# Patient Record
Sex: MALE | Race: BLACK OR AFRICAN AMERICAN | NOT HISPANIC OR LATINO | Employment: UNEMPLOYED | URBAN - NONMETROPOLITAN AREA
[De-identification: names, ages, dates, MRNs, and addresses within clinical notes are randomized per-mention and may not be internally consistent; named-entity substitution may affect disease eponyms.]

---
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
## 2023-09-01 ENCOUNTER — OFFICE VISIT (OUTPATIENT)
Dept: FAMILY MEDICINE CLINIC | Facility: CLINIC | Age: 33
End: 2023-09-01
Payer: COMMERCIAL

## 2023-09-01 DIAGNOSIS — Z11.1 TUBERCULOSIS SCREENING: ICD-10-CM

## 2023-09-01 DIAGNOSIS — R00.2 PALPITATIONS: ICD-10-CM

## 2023-09-01 DIAGNOSIS — R76.8 HEPATITIS A ANTIBODY POSITIVE: ICD-10-CM

## 2023-09-01 DIAGNOSIS — Z02.89 HISTORY AND PHYSICAL EXAMINATION, IMMIGRATION: ICD-10-CM

## 2023-09-01 DIAGNOSIS — Z11.59 NEED FOR HEPATITIS C SCREENING TEST: ICD-10-CM

## 2023-09-01 DIAGNOSIS — R10.13 EPIGASTRIC DISCOMFORT: ICD-10-CM

## 2023-09-01 DIAGNOSIS — Z11.4 ENCOUNTER FOR SCREENING FOR HIV: ICD-10-CM

## 2023-09-01 DIAGNOSIS — B49 FUNGAL INFECTION: Primary | ICD-10-CM

## 2023-09-01 DIAGNOSIS — Z11.59 NEED FOR HEPATITIS B SCREENING TEST: ICD-10-CM

## 2023-09-01 LAB
ALBUMIN SERPL-MCNC: 4.9 G/DL (ref 3.5–5.2)
ALBUMIN/GLOB SERPL: 1.7 G/DL
ALP SERPL-CCNC: 61 U/L (ref 39–117)
ALT SERPL W P-5'-P-CCNC: 19 U/L (ref 1–41)
ANION GAP SERPL CALCULATED.3IONS-SCNC: 11 MMOL/L (ref 5–15)
AST SERPL-CCNC: 31 U/L (ref 1–40)
BILIRUB SERPL-MCNC: 0.4 MG/DL (ref 0–1.2)
BILIRUB UR QL STRIP: NEGATIVE
BUN SERPL-MCNC: 14 MG/DL (ref 6–20)
BUN/CREAT SERPL: 13.9 (ref 7–25)
CALCIUM SPEC-SCNC: 9.6 MG/DL (ref 8.6–10.5)
CHLORIDE SERPL-SCNC: 101 MMOL/L (ref 98–107)
CHOLEST SERPL-MCNC: 143 MG/DL (ref 0–200)
CLARITY UR: CLEAR
CO2 SERPL-SCNC: 24 MMOL/L (ref 22–29)
COLOR UR: YELLOW
CREAT SERPL-MCNC: 1.01 MG/DL (ref 0.76–1.27)
DEPRECATED RDW RBC AUTO: 42.4 FL (ref 37–54)
EGFRCR SERPLBLD CKD-EPI 2021: 102 ML/MIN/1.73
ERYTHROCYTE [DISTWIDTH] IN BLOOD BY AUTOMATED COUNT: 13.9 % (ref 12.3–15.4)
GLOBULIN UR ELPH-MCNC: 2.9 GM/DL
GLUCOSE SERPL-MCNC: 100 MG/DL (ref 65–99)
GLUCOSE UR STRIP-MCNC: NEGATIVE MG/DL
HBA1C MFR BLD: 5.4 % (ref 4.8–5.6)
HBV SURFACE AG SERPL QL IA: NORMAL
HCT VFR BLD AUTO: 49 % (ref 37.5–51)
HCV AB SER DONR QL: NORMAL
HDLC SERPL-MCNC: 55 MG/DL (ref 40–60)
HGB BLD-MCNC: 17.4 G/DL (ref 13–17.7)
HGB UR QL STRIP.AUTO: NEGATIVE
HIV1 P24 AG SER QL: NORMAL
HIV1+2 AB SER QL: NORMAL
KETONES UR QL STRIP: NEGATIVE
LDLC SERPL CALC-MCNC: 78 MG/DL (ref 0–100)
LDLC/HDLC SERPL: 1.44 {RATIO}
LEUKOCYTE ESTERASE UR QL STRIP.AUTO: NEGATIVE
MCH RBC QN AUTO: 30.1 PG (ref 26.6–33)
MCHC RBC AUTO-ENTMCNC: 35.5 G/DL (ref 31.5–35.7)
MCV RBC AUTO: 84.8 FL (ref 79–97)
NITRITE UR QL STRIP: NEGATIVE
PH UR STRIP.AUTO: 7.5 [PH] (ref 5–8)
PLATELET # BLD AUTO: 302 10*3/MM3 (ref 140–450)
PMV BLD AUTO: 9.2 FL (ref 6–12)
POTASSIUM SERPL-SCNC: 3.5 MMOL/L (ref 3.5–5.2)
PROT SERPL-MCNC: 7.8 G/DL (ref 6–8.5)
PROT UR QL STRIP: NEGATIVE
RBC # BLD AUTO: 5.78 10*6/MM3 (ref 4.14–5.8)
SODIUM SERPL-SCNC: 136 MMOL/L (ref 136–145)
SP GR UR STRIP: 1.02 (ref 1–1.03)
TRIGL SERPL-MCNC: 44 MG/DL (ref 0–150)
TSH SERPL DL<=0.05 MIU/L-ACNC: 3.19 UIU/ML (ref 0.27–4.2)
UROBILINOGEN UR QL STRIP: NORMAL
VIT B12 BLD-MCNC: 351 PG/ML (ref 211–946)
VLDLC SERPL-MCNC: 10 MG/DL (ref 5–40)
WBC NRBC COR # BLD: 3.13 10*3/MM3 (ref 3.4–10.8)

## 2023-09-01 PROCEDURE — 81003 URINALYSIS AUTO W/O SCOPE: CPT

## 2023-09-01 PROCEDURE — 86480 TB TEST CELL IMMUN MEASURE: CPT

## 2023-09-01 PROCEDURE — 84443 ASSAY THYROID STIM HORMONE: CPT

## 2023-09-01 PROCEDURE — 86803 HEPATITIS C AB TEST: CPT

## 2023-09-01 PROCEDURE — 80061 LIPID PANEL: CPT

## 2023-09-01 PROCEDURE — 86709 HEPATITIS A IGM ANTIBODY: CPT

## 2023-09-01 PROCEDURE — 87491 CHLMYD TRACH DNA AMP PROBE: CPT

## 2023-09-01 PROCEDURE — 86762 RUBELLA ANTIBODY: CPT

## 2023-09-01 PROCEDURE — 80053 COMPREHEN METABOLIC PANEL: CPT

## 2023-09-01 PROCEDURE — 87591 N.GONORRHOEAE DNA AMP PROB: CPT

## 2023-09-01 PROCEDURE — 86708 HEPATITIS A ANTIBODY: CPT

## 2023-09-01 PROCEDURE — 82607 VITAMIN B-12: CPT

## 2023-09-01 PROCEDURE — 85027 COMPLETE CBC AUTOMATED: CPT

## 2023-09-01 PROCEDURE — 86706 HEP B SURFACE ANTIBODY: CPT

## 2023-09-01 PROCEDURE — 87340 HEPATITIS B SURFACE AG IA: CPT

## 2023-09-01 PROCEDURE — 86787 VARICELLA-ZOSTER ANTIBODY: CPT

## 2023-09-01 PROCEDURE — 86592 SYPHILIS TEST NON-TREP QUAL: CPT

## 2023-09-01 PROCEDURE — G0475 HIV COMBINATION ASSAY: HCPCS

## 2023-09-01 PROCEDURE — 83036 HEMOGLOBIN GLYCOSYLATED A1C: CPT

## 2023-09-01 PROCEDURE — 86765 RUBEOLA ANTIBODY: CPT

## 2023-09-01 PROCEDURE — 86704 HEP B CORE ANTIBODY TOTAL: CPT

## 2023-09-01 PROCEDURE — 86735 MUMPS ANTIBODY: CPT

## 2023-09-01 RX ORDER — CLOTRIMAZOLE 1 %
1 CREAM (GRAM) TOPICAL 2 TIMES DAILY
Qty: 40 G | Refills: 0 | Status: SHIPPED | OUTPATIENT
Start: 2023-09-01

## 2023-09-02 LAB
HAV AB SER QL IA: POSITIVE
HBV CORE AB SERPL QL IA: POSITIVE
HBV SURFACE AB SER RIA-ACNC: REACTIVE
MEV IGG SER IA-ACNC: >300 AU/ML
MUV IGG SER IA-ACNC: 111 AU/ML
RPR SER QL: NORMAL
RUBV IGG SERPL IA-ACNC: 3.9 INDEX
VZV IGG SER IA-ACNC: 1006 INDEX

## 2023-09-03 LAB
GAMMA INTERFERON BACKGROUND BLD IA-ACNC: 0.06 IU/ML
M TB IFN-G BLD-IMP: NEGATIVE
M TB IFN-G CD4+ T-CELLS BLD-ACNC: 0.06 IU/ML
M TBIFN-G CD4+ CD8+T-CELLS BLD-ACNC: 0.06 IU/ML
MITOGEN IGNF BLD-ACNC: >10 IU/ML
QUANTIFERON INCUBATION: NORMAL
SERVICE CMNT-IMP: NORMAL

## 2023-09-04 VITALS
DIASTOLIC BLOOD PRESSURE: 86 MMHG | TEMPERATURE: 98.2 F | HEART RATE: 89 BPM | WEIGHT: 173.2 LBS | SYSTOLIC BLOOD PRESSURE: 136 MMHG | HEIGHT: 66 IN | BODY MASS INDEX: 27.83 KG/M2 | OXYGEN SATURATION: 98 % | RESPIRATION RATE: 15 BRPM

## 2023-09-04 DIAGNOSIS — R76.8 HEPATITIS A ANTIBODY POSITIVE: Primary | ICD-10-CM

## 2023-09-04 LAB — HAV IGM SERPL QL IA: NORMAL

## 2023-09-05 DIAGNOSIS — A74.9 CHLAMYDIA: Primary | ICD-10-CM

## 2023-09-05 DIAGNOSIS — R76.8 HEPATITIS B ANTIBODY POSITIVE: Primary | ICD-10-CM

## 2023-09-05 LAB
C TRACH RRNA SPEC QL NAA+PROBE: POSITIVE
N GONORRHOEA RRNA SPEC QL NAA+PROBE: NEGATIVE

## 2023-09-05 RX ORDER — DOXYCYCLINE HYCLATE 100 MG/1
100 CAPSULE ORAL 2 TIMES DAILY
Qty: 14 CAPSULE | Refills: 0 | Status: SHIPPED | OUTPATIENT
Start: 2023-09-05 | End: 2023-09-12

## 2023-09-15 ENCOUNTER — OFFICE VISIT (OUTPATIENT)
Dept: FAMILY MEDICINE CLINIC | Facility: CLINIC | Age: 33
End: 2023-09-15
Payer: COMMERCIAL

## 2023-09-15 VITALS
SYSTOLIC BLOOD PRESSURE: 132 MMHG | TEMPERATURE: 97.6 F | OXYGEN SATURATION: 100 % | BODY MASS INDEX: 27.48 KG/M2 | WEIGHT: 171 LBS | HEART RATE: 99 BPM | RESPIRATION RATE: 16 BRPM | HEIGHT: 66 IN | DIASTOLIC BLOOD PRESSURE: 70 MMHG

## 2023-09-15 DIAGNOSIS — R10.13 EPIGASTRIC DISCOMFORT: ICD-10-CM

## 2023-09-15 DIAGNOSIS — R00.2 PALPITATIONS: ICD-10-CM

## 2023-09-15 DIAGNOSIS — A74.9 CHLAMYDIA: ICD-10-CM

## 2023-09-15 DIAGNOSIS — B49 FUNGAL INFECTION: Primary | ICD-10-CM

## 2023-09-15 NOTE — PROGRESS NOTES
"    Office Note     Name: Meaghan Smith    : 1990     MRN: [id number]     Chief Complaint  Two week follow up     History of Present Illness:  Meaghan Smith is a 33 y.o. male who presents today for a 2-week follow-up visit.  He was seen in the office 2 weeks ago for establishment of care and for several health concerns.    At his last appointment, he was started on topical clotrimazole for a fungal infection between his right fourth and fifth toes.  He reports that he was unable to get the medication until this week so he has only been applying it for 1 week.  The area has seemed slightly smaller and he reports there is less pain at the area when he wears tight shoes but the area is still there.  He denies any side effects to the cream.  He has had no fever or chills or worsening of the area.    He also reports that he is tolerating the doxycycline well, that he was treated with for chlamydia.  He denies any  symptoms such as penile discharge, rash, dysuria, etc.  He denies having any recent sexual partners, most recent sexual partner does not live in this country.    He has not completed his H. pylori breath testing yet.  He denies any worsening of his acid reflux symptoms.    He denies that he has had further episodes of heart palpitations, reports that these \"only happen when he is stressed.\"  Reports that he began experiencing this as a small child, tells an example of having his heart race when the teacher called on him in school.  He denies feeling worried or stressed very frequently, denies discussion of medications for anxiety/stress.  He denies chest pain.      Subjective     Review of Systems:   Review of Systems   Constitutional:  Negative for chills and fever.   Respiratory:  Negative for cough and shortness of breath.    Cardiovascular:  Negative for chest pain, palpitations and leg swelling.   Gastrointestinal:  Negative for abdominal pain, constipation, diarrhea, nausea and " "vomiting.   Genitourinary:  Negative for discharge, dysuria and penile pain.   Skin:  Negative for rash.   Neurological:  Negative for dizziness, weakness, light-headedness and headache.   Psychiatric/Behavioral:  Negative for depressed mood. The patient is not nervous/anxious.      I have reviewed the patients family history, social history, past medical history, past surgical history and have updated it as appropriate.     Past Medical History: No past medical history on file.    Past Surgical History: No past surgical history on file.    Family History:   Family History   Problem Relation Age of Onset    Stroke Maternal Grandmother        Social History:   Social History     Socioeconomic History    Marital status: Single   Tobacco Use    Smoking status: Never    Smokeless tobacco: Never   Vaping Use    Vaping Use: Never used   Substance and Sexual Activity    Alcohol use: Yes     Alcohol/week: 2.0 standard drinks     Types: 2 Cans of beer per week     Comment: weekly    Drug use: Never    Sexual activity: Not Currently       Immunizations:   There is no immunization history on file for this patient.     Medications:     Current Outpatient Medications:     clotrimazole (LOTRIMIN) 1 % cream, Apply 1 application  topically to the appropriate area as directed 2 (Two) Times a Day. Apply to affected area twice per day for 2 weeks, Disp: 40 g, Rfl: 0    Allergies:   No Known Allergies    Objective     Vital Signs  Vitals:    09/15/23 1041   BP: 132/70   BP Location: Right arm   Patient Position: Sitting   Cuff Size: Large Adult   Pulse: 99   Resp: 16   Temp: 97.6 °F (36.4 °C)   TempSrc: Temporal   SpO2: 100%   Weight: 77.6 kg (171 lb)   Height: 167.6 cm (66\")     Estimated body mass index is 27.6 kg/m² as calculated from the following:    Height as of this encounter: 167.6 cm (66\").    Weight as of this encounter: 77.6 kg (171 lb).          Physical Exam  Vitals and nursing note reviewed.   Constitutional:       " General: He is not in acute distress.     Appearance: Normal appearance. He is not ill-appearing, toxic-appearing or diaphoretic.   HENT:      Head: Normocephalic and atraumatic.   Eyes:      Extraocular Movements: Extraocular movements intact.   Cardiovascular:      Rate and Rhythm: Normal rate and regular rhythm.      Pulses:           Dorsalis pedis pulses are 2+ on the right side.        Posterior tibial pulses are 2+ on the right side.      Heart sounds: No murmur heard.    No friction rub. No gallop.   Pulmonary:      Effort: Pulmonary effort is normal. No respiratory distress.      Breath sounds: No wheezing, rhonchi or rales.   Abdominal:      Palpations: Abdomen is soft.      Tenderness: There is no abdominal tenderness. There is no guarding or rebound.   Musculoskeletal:      Cervical back: Normal range of motion.   Feet:      Right foot:      Skin integrity: No ulcer, blister or erythema.   Skin:     Coloration: Skin is not pale.      Comments: White appearing skin lesion between R 4th and 5th toes is still  present, but smaller in size.  No swelling to the area, no open wound.   Neurological:      Mental Status: He is alert and oriented to person, place, and time. Mental status is at baseline.   Psychiatric:         Mood and Affect: Mood normal.         Thought Content: Thought content normal.        Assessment and Plan     1. Fungal infection  -Continue with prescription clotrimazole ointment.  -We will reexamine his skin at follow-up appointment.  Did discuss that it can take 2 weeks or more for fungal infections to fully clear with topical treatment.  If symptoms worsen or new symptoms develop, he has been encouraged to return to care sooner.    2. Palpitations  -He denies further episodes of palpitations.  -Advised that he decrease caffeine intake and increase water intake.  -Did discuss we are still awaiting referral for cardiology consultation.    3. Epigastric discomfort  -Patient denies any  worsening of his epigastric discomfort. Physical exam is benign. Did remind him to have H. pylori breath testing completed.  If H. pylori breath testing is negative, we will initiate trial of PPI and see how he does.    4. Chlamydia  -Continue with course of doxycycline.  He has tolerated medication well. Did discuss possible side effect of photosensitivity, advised that he avoid excessive sun exposure and wear sun screen while on medication.  -Discussed that he should not be sexually active until his treatment is completely finished.  -Did discuss treating his left sexual partner, however they are not living in this country.       I spent 31 minutes caring for Meaghan Smith on this date of service. This time includes time spent by me in the following activities: preparing for the visit, reviewing tests, performing a medically appropriate examination and/or evaluation, counseling and educating the patient/family/caregiver, ordering medications, tests, or procedures and documenting information in the medical record.    Follow Up  Return in about 1 month (around 10/15/2023) for Recheck.    SEFERINO Graham

## 2023-09-28 ENCOUNTER — LAB (OUTPATIENT)
Dept: LAB | Facility: HOSPITAL | Age: 33
End: 2023-09-28
Payer: COMMERCIAL

## 2023-09-28 DIAGNOSIS — R10.13 EPIGASTRIC DISCOMFORT: ICD-10-CM

## 2023-09-28 DIAGNOSIS — R76.8 HEPATITIS B ANTIBODY POSITIVE: ICD-10-CM

## 2023-09-28 DIAGNOSIS — Z02.89 HISTORY AND PHYSICAL EXAMINATION, IMMIGRATION: ICD-10-CM

## 2023-09-28 LAB — URATE SERPL-MCNC: 5.7 MG/DL (ref 3.4–7)

## 2023-09-28 PROCEDURE — 87517 HEPATITIS B DNA QUANT: CPT

## 2023-09-28 PROCEDURE — 83013 H PYLORI (C-13) BREATH: CPT

## 2023-09-28 PROCEDURE — 84550 ASSAY OF BLOOD/URIC ACID: CPT

## 2023-09-29 LAB
HBV DNA SERPL NAA+PROBE-ACNC: NORMAL IU/ML
HBV DNA SERPL NAA+PROBE-LOG IU: NORMAL LOG10 IU/ML
REF LAB TEST REF RANGE: NORMAL

## 2023-09-30 LAB — UREA BREATH TEST QL: POSITIVE

## 2023-10-02 DIAGNOSIS — A04.8 H. PYLORI INFECTION: Primary | ICD-10-CM

## 2023-10-02 RX ORDER — CLARITHROMYCIN 500 MG/1
500 TABLET, COATED ORAL 2 TIMES DAILY
Qty: 28 TABLET | Refills: 0 | Status: SHIPPED | OUTPATIENT
Start: 2023-10-02 | End: 2023-10-16

## 2023-10-02 RX ORDER — AMOXICILLIN 500 MG/1
1000 CAPSULE ORAL 2 TIMES DAILY
Qty: 56 CAPSULE | Refills: 0 | Status: SHIPPED | OUTPATIENT
Start: 2023-10-02 | End: 2023-10-16

## 2023-10-02 RX ORDER — OMEPRAZOLE 20 MG/1
20 CAPSULE, DELAYED RELEASE ORAL 2 TIMES DAILY
Qty: 28 CAPSULE | Refills: 0 | Status: SHIPPED | OUTPATIENT
Start: 2023-10-02 | End: 2023-10-16

## 2023-10-16 ENCOUNTER — PATIENT ROUNDING (BHMG ONLY) (OUTPATIENT)
Dept: FAMILY MEDICINE CLINIC | Facility: CLINIC | Age: 33
End: 2023-10-16
Payer: COMMERCIAL

## 2023-10-16 NOTE — PROGRESS NOTES
A [employer] message has been sent to the patient for PATIENT   ROUNDING with [facility]

## 2023-10-16 NOTE — PROGRESS NOTES
"[facility] Cardiology  Consultation H&P  Meaghan Smith  1990  [address] [address]     VISIT DATE:  10/20/23    PCP: Deborah Burroughs PA-C [address] [facility] [place] [address]    IDENTIFICATION: A 33 y.o. [place] male     PROBLEM LIST:  Palpitations \"when stressed\"  GERD  (+) H. pylori 9/23  Chlamydia  Lipid status 9/23 , trig 44, HDL 55, LDL 70      CC:  Chief Complaint   Patient presents with    Chest Pain           Allergies  No Known Allergies    Current Medications    Current Outpatient Medications:     clotrimazole (LOTRIMIN) 1 % cream, Apply 1 application  topically to the appropriate area as directed 2 (Two) Times a Day. Apply to affected area twice per day for 2 weeks, Disp: 40 g, Rfl: 0     History of Present Illness   HPI    Patient's main language is Mexican and only understands minimal English.   via telephone was used for history.    Meaghan Smith is a 33 y.o. year old male with the above mentioned PMH who presents for consult from Deborah Burroughs PA-C for evaluation of palpitations.  He notes that since he was a child he has had elevated heart rates and palpitations whenever trying anything new for the first time or being in a new place with related anxiety.  These episodes are not associated with any other symptoms like shortness of breath, chest discomfort, near syncope or diaphoresis.  He drinks just 1 cup of coffee in the morning but no other energy drinks and has discontinued soda on a regular basis.  He denies any decongestants or illegal substance use.  His resting heart rate is 110 in office today.    Pt denies any chest pain, dyspnea at rest, dyspnea on exertion, orthopnea, PND, lower extremity edema, or claudication. Pt denies history of CHF, DVT, PE, MI, CVA, TIA, or rheumatic fever.       ROS  Review of Systems   Cardiovascular:  Positive for palpitations.   Psychiatric/Behavioral:  The patient is nervous/anxious.  " "  All other systems reviewed and are negative.      SOCIAL HX  Social History     Socioeconomic History    Marital status: Single   Tobacco Use    Smoking status: Never    Smokeless tobacco: Never   Vaping Use    Vaping Use: Never used   Substance and Sexual Activity    Alcohol use: Yes     Alcohol/week: 2.0 standard drinks of alcohol     Types: 2 Cans of beer per week     Comment: occ    Drug use: Never    Sexual activity: Not Currently       FAMILY HX  Family History   Problem Relation Age of Onset    Hypertension Mother     Stroke Maternal Grandmother     Hypertension Maternal Grandmother        Vitals:    10/20/23 1121   BP: 136/76   BP Location: Right arm   Patient Position: Sitting   Pulse: 87   SpO2: 100%   Weight: 75.7 kg (166 lb 12.8 oz)   Height: 167.6 cm (65.98\")     Body mass index is 26.94 kg/m².     PHYSICAL EXAMINATION:  Constitutional:       Appearance: Healthy appearance. Not in distress.   Pulmonary:      Effort: Pulmonary effort is normal.      Breath sounds: Normal breath sounds. No wheezing. No rhonchi. No rales.   Cardiovascular:      PMI at left midclavicular line. Tachycardia present. Regular rhythm. Normal S1. Normal S2.       Murmurs: There is no murmur.      No gallop.  No click. No rub.   Pulses:     Intact distal pulses.   Edema:     Peripheral edema absent.   Skin:     General: Skin is warm and dry.   Neurological:      General: No focal deficit present.      Mental Status: Alert and oriented to person, place and time.         Diagnostic Data:    ECG 12 Lead    Date/Time: 10/20/2023 12:47 PM  Performed by: Ryan Hurt PA-C    Authorized by: Ryan Hurt PA-C  Comparison: not compared with previous ECG   Previous ECG: no previous ECG available  Rhythm: sinus tachycardia  Rate: tachycardic  BPM: 113  ST Flattening: II, III and aVF  T inversion: V5 and V6  QRS axis: right  Other findings: non-specific ST-T wave changes    Clinical impression: non-specific ECG        Lab Results "   Component Value Date    TRIG 44 09/01/2023    HDL 55 09/01/2023     Lab Results   Component Value Date    GLUCOSE 100 (H) 09/01/2023    BUN 14 09/01/2023    CREATININE 1.01 09/01/2023     09/01/2023    K 3.5 09/01/2023     09/01/2023    CO2 24.0 09/01/2023     Lab Results   Component Value Date    HGBA1C 5.40 09/01/2023     Lab Results   Component Value Date    WBC 3.13 (L) 09/01/2023    HGB 17.4 09/01/2023    HCT 49.0 09/01/2023     09/01/2023         Advance Care Planning   ACP discussion was declined by the patient. Patient does not have an advance directive, declines further assistance.         ASSESSMENT:   Diagnosis Plan   1. Palpitations  Holter Monitor - 72 Hour Up To 15 Days    ECG 12 Lead      2. Sinus tachycardia            PLAN:  Palpitations/Sinus Tachycardia- Patient with resting heart rate 110 in office today.  He notes palpitations and high heart rate associated with anxiety and being in new places since he was a kid.  He has not had any full evaluation of this.  I would recommend consideration of treatment of his anxiety.  Additionally, we will place a 7-day Holter monitor to rule out any tachyarrhythmia and assess tachy burden.  We discussed the addition of bisoprolol as needed or scheduled following Holter monitor results.    Deborah Burroughs PA-C, thank you for referring Mr. Smith for evaluation.  I have forwarded my electronically generated recommendations to you for review.  Please do not hesitate to call with any questions.    Electronically signed by Ryan Hurt PA-C, 10/20/23, 12:52 PM EDT.

## 2023-10-20 ENCOUNTER — OFFICE VISIT (OUTPATIENT)
Dept: CARDIOLOGY | Facility: CLINIC | Age: 33
End: 2023-10-20
Payer: COMMERCIAL

## 2023-10-20 VITALS
OXYGEN SATURATION: 100 % | DIASTOLIC BLOOD PRESSURE: 76 MMHG | SYSTOLIC BLOOD PRESSURE: 136 MMHG | WEIGHT: 166.8 LBS | BODY MASS INDEX: 26.81 KG/M2 | HEIGHT: 66 IN | HEART RATE: 87 BPM

## 2023-10-20 DIAGNOSIS — R00.0 SINUS TACHYCARDIA: ICD-10-CM

## 2023-10-20 DIAGNOSIS — R00.2 PALPITATIONS: Primary | ICD-10-CM

## 2023-11-08 ENCOUNTER — TELEPHONE (OUTPATIENT)
Dept: CARDIOLOGY | Facility: CLINIC | Age: 33
End: 2023-11-08
Payer: COMMERCIAL

## 2023-11-08 NOTE — TELEPHONE ENCOUNTER
Called to discuss Holter results. Spoe with his sponsor Kori as he does not speak English. Holter monitor results normal with no arrhythmia. Recommended having his anxiety further evaluated and treated by his PCP.     Electronically signed by Ryan Hurt PA-C, 11/08/23, 1:45 PM EST.

## 2023-11-10 ENCOUNTER — OFFICE VISIT (OUTPATIENT)
Dept: FAMILY MEDICINE CLINIC | Facility: CLINIC | Age: 33
End: 2023-11-10
Payer: COMMERCIAL

## 2023-11-10 VITALS
WEIGHT: 164.8 LBS | TEMPERATURE: 97.7 F | OXYGEN SATURATION: 100 % | HEIGHT: 66 IN | SYSTOLIC BLOOD PRESSURE: 132 MMHG | BODY MASS INDEX: 26.48 KG/M2 | HEART RATE: 95 BPM | DIASTOLIC BLOOD PRESSURE: 80 MMHG | RESPIRATION RATE: 16 BRPM

## 2023-11-10 DIAGNOSIS — L03.115 CELLULITIS OF RIGHT LOWER EXTREMITY: ICD-10-CM

## 2023-11-10 DIAGNOSIS — A74.9 CHLAMYDIA: ICD-10-CM

## 2023-11-10 DIAGNOSIS — R00.2 PALPITATIONS: ICD-10-CM

## 2023-11-10 DIAGNOSIS — Z00.00 WELL ADULT EXAM: Primary | ICD-10-CM

## 2023-11-10 DIAGNOSIS — Z23 NEED FOR TDAP VACCINATION: ICD-10-CM

## 2023-11-10 PROCEDURE — 87491 CHLMYD TRACH DNA AMP PROBE: CPT | Performed by: FAMILY MEDICINE

## 2023-11-10 PROCEDURE — 87591 N.GONORRHOEAE DNA AMP PROB: CPT | Performed by: FAMILY MEDICINE

## 2023-11-10 RX ORDER — CLOSTRIDIUM TETANI TOXOID ANTIGEN (FORMALDEHYDE INACTIVATED), CORYNEBACTERIUM DIPHTHERIAE TOXOID ANTIGEN (FORMALDEHYDE INACTIVATED), BORDETELLA PERTUSSIS TOXOID ANTIGEN (GLUTARALDEHYDE INACTIVATED), BORDETELLA PERTUSSIS FILAMENTOUS HEMAGGLUTININ ANTIGEN (FORMALDEHYDE INACTIVATED), BORDETELLA PERTUSSIS PERTACTIN ANTIGEN, AND BORDETELLA PERTUSSIS FIMBRIAE 2/3 ANTIGEN 5; 2; 2.5; 5; 3; 5 [LF]/.5ML; [LF]/.5ML; UG/.5ML; UG/.5ML; UG/.5ML; UG/.5ML
0.5 INJECTION, SUSPENSION INTRAMUSCULAR ONCE
Qty: 0.5 ML | Refills: 0 | Status: SHIPPED | OUTPATIENT
Start: 2023-11-10 | End: 2023-11-10

## 2023-11-10 RX ORDER — AMOXICILLIN AND CLAVULANATE POTASSIUM 875; 125 MG/1; MG/1
1 TABLET, FILM COATED ORAL 2 TIMES DAILY
Qty: 20 TABLET | Refills: 0 | Status: SHIPPED | OUTPATIENT
Start: 2023-11-10

## 2023-11-10 NOTE — PROGRESS NOTES
Male Physical Note      Date: 11/10/2023   Patient Name: Meaghan Smith  : 1990   MRN: [id number]     Chief Complaint:    Chief Complaint   Patient presents with    Annual Exam    Rash     Follow up on feet rash right foot.       History of Present Illness: Meaghan Smith is a 33 y.o. male who is here today for their annual health maintenance and physical.  Patient has been well since last being seen with time problems noted.  He does continue to have some issues with respect to the lesion between his fourth and fifth digit of his right foot.  He has been tried on a course of topical treatment that did not show any improvement.  He does continue to have pain at that site.  Otherwise he has been doing well.  He denies any other issues at present time.  He has continue with his usual activity, appetite and sleep.  Patient on the other provider, situational testing, urologic or neurologic complaints.      Subjective      Review of Systems:   Review of Systems   Constitutional:  Negative for activity change, appetite change and fatigue.   Respiratory:  Negative for cough and shortness of breath.    Cardiovascular:  Negative for chest pain, palpitations and leg swelling.   Gastrointestinal:  Positive for GERD. Negative for abdominal pain, blood in stool, constipation, diarrhea, nausea, vomiting and indigestion.   Genitourinary:  Negative for dysuria, flank pain, frequency and urgency.   Musculoskeletal:  Negative for arthralgias and myalgias.   Neurological:  Negative for dizziness, tremors, seizures, weakness, light-headedness, numbness, headache and memory problem.   Psychiatric/Behavioral:  Positive for sleep disturbance and depressed mood. The patient is not nervous/anxious.        Past Medical History, Social History, Family History and Care Team were all reviewed with patient and updated as appropriate.     Medications:     Current Outpatient Medications:     amoxicillin-clavulanate (AUGMENTIN)  "875-125 MG per tablet, Take 1 tablet by mouth 2 (Two) Times a Day., Disp: 20 tablet, Rfl: 0    Allergies:   No Known Allergies    Immunizations:  Health Maintenance Summary            Overdue - BMI FOLLOWUP (Yearly) Never done      No completion, postpone, or frequency change history exists for this topic.              Overdue - COVID-19 Vaccine (1) Never done      No completion, postpone, or frequency change history exists for this topic.              Overdue - TDAP/TD VACCINES (1 - Tdap) Never done      No completion, postpone, or frequency change history exists for this topic.              Overdue - INFLUENZA VACCINE (Yearly - August to March) Never done      No completion, postpone, or frequency change history exists for this topic.              ANNUAL PHYSICAL (Yearly) Next due on 11/10/2024      11/10/2023  Done              HEPATITIS C SCREENING  Completed      09/01/2023  Hepatitis C antibody              Pneumococcal Vaccine 0-64 (Series Information) Aged Out      No completion, postpone, or frequency change history exists for this topic.                    No orders of the defined types were placed in this encounter.      Colorectal Screening:   Deferred.  Last Completed Colonoscopy       This patient has no relevant Health Maintenance data.          CT for Smoker (Age 50-80, 20pk yr within last 15 years): Deferred.  Bone Density/DEXA (high risk): Deferred.  Hep C (Age 18-79 once): Previously ordered.  HIV (Age 15-65 once) : Negative.  PSA (Over age 50, C Level Recommendation): Deferred.  US Aorta (For male smokers, age 65): Deferred.  Advised if necessary.  A1c:   Hemoglobin A1C   Date Value Ref Range Status   09/01/2023 5.40 4.80 - 5.60 % Final     Lipid panel: No results found for: \"LABLIPI\"    The ASCVD Risk score (Wily DK, et al., 2019) failed to calculate for the following reasons:    The 2019 ASCVD risk score is only valid for ages 40 to 79    Dermatology: Advised if necessary.  Ophthalmologist: " "Advised.  Dentist: Advised.    Tobacco Use: Low Risk  (11/10/2023)    Patient History     Smoking Tobacco Use: Never     Smokeless Tobacco Use: Never     Passive Exposure: Not on file       Social History     Substance and Sexual Activity   Alcohol Use Yes    Alcohol/week: 2.0 standard drinks of alcohol    Types: 2 Cans of beer per week    Comment: occ        Social History     Substance and Sexual Activity   Drug Use Never        Diet/Physical activity: Well-balanced diet/daily exercise.    Sexual health: No issues at present time.    PHQ-2 Depression Screening  PHQ-9 Total Score:    0    Measures:   Advanced Care Planning:   Did not discuss.    Smoking Cessation:   Non-smoker.     Objective     Physical Exam:  Vital Signs:   Vitals:    11/10/23 1105   BP: 132/80   BP Location: Left arm   Patient Position: Sitting   Cuff Size: Adult   Pulse: 95   Resp: 16   Temp: 97.7 °F (36.5 °C)   TempSrc: Temporal   SpO2: 100%   Weight: 74.8 kg (164 lb 12.8 oz)   Height: 167.6 cm (66\")     Body mass index is 26.6 kg/m².     Physical Exam  Vitals and nursing note reviewed.   Constitutional:       Appearance: Normal appearance.   HENT:      Head: Normocephalic and atraumatic.      Nose: Nose normal.      Mouth/Throat:      Pharynx: Oropharynx is clear.   Eyes:      Extraocular Movements: Extraocular movements intact.      Pupils: Pupils are equal, round, and reactive to light.   Neck:      Thyroid: No thyroid mass or thyromegaly.      Trachea: Trachea normal.   Cardiovascular:      Rate and Rhythm: Normal rate and regular rhythm.      Pulses: Normal pulses. No decreased pulses.      Heart sounds: Normal heart sounds.   Pulmonary:      Effort: Pulmonary effort is normal.      Breath sounds: Normal breath sounds.   Abdominal:      General: Abdomen is flat. Bowel sounds are normal.      Palpations: Abdomen is soft.      Tenderness: There is no abdominal tenderness.   Musculoskeletal:      Cervical back: Neck supple.      Right lower " leg: No edema.      Left lower leg: No edema.   Lymphadenopathy:      Cervical: No cervical adenopathy.   Skin:     General: Skin is warm and dry.      Comments: Patient does have a wound between his fourth and fifth digit of his right foot.   Neurological:      General: No focal deficit present.      Mental Status: He is alert and oriented to person, place, and time.      Sensory: Sensation is intact.      Motor: Motor function is intact.      Coordination: Coordination is intact.   Psychiatric:         Attention and Perception: Attention normal.         Mood and Affect: Mood normal.         Speech: Speech normal.         Behavior: Behavior normal.          POCT Results (if applicable):   Results for orders placed or performed in visit on 09/28/23   H. Pylori Breath Test - Breath, Lung    Specimen: Lung; Breath   Result Value Ref Range    H. pylori Breath Test Positive (A) Negative   Hepatitis B DNA, Quantitative, PCR    Specimen: Blood   Result Value Ref Range    HBV IU/mL HBV DNA not detected IU/mL    log10 HBV IU/mL TNP log10 IU/mL    Test Information Comment    Uric acid    Specimen: Blood   Result Value Ref Range    Uric Acid 5.7 3.4 - 7.0 mg/dL       Procedures    Assessment / Plan      Assessment/Plan:   Diagnoses and all orders for this visit:    1. Well adult exam (Primary)   Scheduled symptoms today that did not reveal any abnormality.  We did discuss safety issues as well as anticipatory guidance..  We did review his laboratory data from previous appointment.  We will continue to monitor and will treat accordingly.    2. Chlamydia  Patient did have previous chlamydia and was treated with a repeat sutures and has complete resolution of his infection.  -     Chlamydia trachomatis, Neisseria gonorrhoeae, PCR - Urine, Urine, Clean Catch; Future  -     Chlamydia trachomatis, Neisseria gonorrhoeae, PCR - Urine, Urine, Clean Catch    3. Need for Tdap vaccination  We have discussed the necessity of a Tdap.  We  will send a request to the pharmacy.  -     Tdap (Adacel) 5-2-15.5 LF-MCG/0.5 injection; Inject 0.5 mL into the appropriate muscle as directed by prescriber 1 (One) Time for 1 dose.  Dispense: 0.5 mL; Refill: 0    4. Palpitations   Patient has not had problems with palpitations.  His Holter monitor did not reveal any abnormality except a few PACs.  We will continue to treat currently.    5. Cellulitis of right lower extremity   Patient does continue to have a wound between his fourth and fifth digit of his right foot.  We will try course of Augmentin to see if he has some underlying pathology.  We will pursue and treat accordingly.  Further evaluation treatment may be necessary.  -     amoxicillin-clavulanate (AUGMENTIN) 875-125 MG per tablet; Take 1 tablet by mouth 2 (Two) Times a Day.  Dispense: 20 tablet; Refill: 0         Healthcare Maintenance:  Counseling provided based on age appropriate USPSTF guidelines.  BMI is >= 25 and <30. (Overweight) The following options were offered after discussion;: weight loss educational material (shared in after visit summary) and exercise counseling/recommendations    Meaghan Smith voices understanding and acceptance of this advice and will call back with any further questions or concerns. AVS with preventive healthcare tips printed for patient.     Follow Up:   No follow-ups on file.    At [facility], we believe that sharing information builds trust and better relationships. You are receiving this note because you recently visited [facility]. It is possible you will see health information before a provider has talked with you about it. This kind of information can be easy to misunderstand. To help you fully understand what it means for your health, we urge you to discuss this note with your provider.    Raghu Bourgeois MD  [facility] [place]

## 2023-11-14 LAB
C TRACH RRNA SPEC QL NAA+PROBE: NEGATIVE
N GONORRHOEA RRNA SPEC QL NAA+PROBE: NEGATIVE